# Patient Record
Sex: FEMALE | Race: WHITE | NOT HISPANIC OR LATINO
[De-identification: names, ages, dates, MRNs, and addresses within clinical notes are randomized per-mention and may not be internally consistent; named-entity substitution may affect disease eponyms.]

---

## 2018-05-11 ENCOUNTER — CHARTING TRANS (OUTPATIENT)
Dept: OTHER | Age: 43
End: 2018-05-11

## 2018-11-01 VITALS
RESPIRATION RATE: 14 BRPM | BODY MASS INDEX: 21.23 KG/M2 | WEIGHT: 119.82 LBS | HEIGHT: 63 IN | TEMPERATURE: 98.1 F | HEART RATE: 84 BPM

## 2018-12-15 ENCOUNTER — WALK IN (OUTPATIENT)
Dept: URGENT CARE | Age: 43
End: 2018-12-15

## 2018-12-15 VITALS
SYSTOLIC BLOOD PRESSURE: 118 MMHG | WEIGHT: 125 LBS | DIASTOLIC BLOOD PRESSURE: 72 MMHG | HEIGHT: 63 IN | HEART RATE: 77 BPM | TEMPERATURE: 98.3 F | OXYGEN SATURATION: 99 % | RESPIRATION RATE: 18 BRPM | BODY MASS INDEX: 22.15 KG/M2

## 2018-12-15 DIAGNOSIS — H10.532 CONTACT BLEPHAROCONJUNCTIVITIS OF LEFT EYE: Primary | ICD-10-CM

## 2018-12-15 PROCEDURE — 99213 OFFICE O/P EST LOW 20 MIN: CPT | Performed by: NURSE PRACTITIONER

## 2018-12-15 RX ORDER — ERYTHROMYCIN 5 MG/G
OINTMENT OPHTHALMIC
Qty: 3.5 G | Refills: 0 | Status: SHIPPED | OUTPATIENT
Start: 2018-12-15

## 2018-12-15 RX ORDER — NORGESTIMATE AND ETHINYL ESTRADIOL 7DAYSX3 LO
KIT ORAL
COMMUNITY

## 2018-12-15 ASSESSMENT — ENCOUNTER SYMPTOMS
VOMITING: 0
WOUND: 0
SORE THROAT: 0
RHINORRHEA: 0
BACK PAIN: 0
APPETITE CHANGE: 0
DIARRHEA: 0
EYE PAIN: 0
SHORTNESS OF BREATH: 0
CHILLS: 0
NAUSEA: 0
EYE REDNESS: 1
EYE DISCHARGE: 0
NUMBNESS: 0
ABDOMINAL PAIN: 0
EYE ITCHING: 1
COUGH: 0
HEADACHES: 0
FEVER: 0
DIZZINESS: 0
PHOTOPHOBIA: 0
FATIGUE: 0
WEAKNESS: 0

## 2025-03-03 ENCOUNTER — OFFICE VISIT (OUTPATIENT)
Dept: FAMILY MEDICINE CLINIC | Facility: CLINIC | Age: 50
End: 2025-03-03
Payer: COMMERCIAL

## 2025-03-03 VITALS
WEIGHT: 132 LBS | BODY MASS INDEX: 22.53 KG/M2 | HEIGHT: 64 IN | SYSTOLIC BLOOD PRESSURE: 110 MMHG | HEART RATE: 93 BPM | DIASTOLIC BLOOD PRESSURE: 70 MMHG | RESPIRATION RATE: 16 BRPM | TEMPERATURE: 100 F | OXYGEN SATURATION: 96 %

## 2025-03-03 DIAGNOSIS — Z00.00 ANNUAL PHYSICAL EXAM: Primary | ICD-10-CM

## 2025-03-03 LAB
ALBUMIN SERPL-MCNC: 4.5 G/DL (ref 3.2–4.8)
ALBUMIN/GLOB SERPL: 1.3 {RATIO} (ref 1–2)
ALP LIVER SERPL-CCNC: 62 U/L
ALT SERPL-CCNC: <7 U/L
ANION GAP SERPL CALC-SCNC: 11 MMOL/L (ref 0–18)
AST SERPL-CCNC: 17 U/L (ref ?–34)
BASOPHILS # BLD AUTO: 0.04 X10(3) UL (ref 0–0.2)
BASOPHILS NFR BLD AUTO: 0.5 %
BILIRUB SERPL-MCNC: 1.1 MG/DL (ref 0.3–1.2)
BUN BLD-MCNC: 8 MG/DL (ref 9–23)
CALCIUM BLD-MCNC: 9.3 MG/DL (ref 8.7–10.6)
CHLORIDE SERPL-SCNC: 103 MMOL/L (ref 98–112)
CHOLEST SERPL-MCNC: 178 MG/DL (ref ?–200)
CO2 SERPL-SCNC: 23 MMOL/L (ref 21–32)
CREAT BLD-MCNC: 0.86 MG/DL
EGFRCR SERPLBLD CKD-EPI 2021: 83 ML/MIN/1.73M2 (ref 60–?)
EOSINOPHIL # BLD AUTO: 0.11 X10(3) UL (ref 0–0.7)
EOSINOPHIL NFR BLD AUTO: 1.4 %
ERYTHROCYTE [DISTWIDTH] IN BLOOD BY AUTOMATED COUNT: 11.8 %
FASTING PATIENT LIPID ANSWER: NO
FASTING STATUS PATIENT QL REPORTED: NO
GLOBULIN PLAS-MCNC: 3.4 G/DL (ref 2–3.5)
GLUCOSE BLD-MCNC: 118 MG/DL (ref 70–99)
HCT VFR BLD AUTO: 40.2 %
HDLC SERPL-MCNC: 60 MG/DL (ref 40–59)
HGB BLD-MCNC: 13.3 G/DL
IMM GRANULOCYTES # BLD AUTO: 0.02 X10(3) UL (ref 0–1)
IMM GRANULOCYTES NFR BLD: 0.2 %
LDLC SERPL CALC-MCNC: 95 MG/DL (ref ?–100)
LYMPHOCYTES # BLD AUTO: 2.57 X10(3) UL (ref 1–4)
LYMPHOCYTES NFR BLD AUTO: 31.8 %
MCH RBC QN AUTO: 29.4 PG (ref 26–34)
MCHC RBC AUTO-ENTMCNC: 33.1 G/DL (ref 31–37)
MCV RBC AUTO: 88.9 FL
MONOCYTES # BLD AUTO: 0.77 X10(3) UL (ref 0.1–1)
MONOCYTES NFR BLD AUTO: 9.5 %
NEUTROPHILS # BLD AUTO: 4.57 X10 (3) UL (ref 1.5–7.7)
NEUTROPHILS # BLD AUTO: 4.57 X10(3) UL (ref 1.5–7.7)
NEUTROPHILS NFR BLD AUTO: 56.6 %
NONHDLC SERPL-MCNC: 118 MG/DL (ref ?–130)
OSMOLALITY SERPL CALC.SUM OF ELEC: 283 MOSM/KG (ref 275–295)
PLATELET # BLD AUTO: 304 10(3)UL (ref 150–450)
POTASSIUM SERPL-SCNC: 3.8 MMOL/L (ref 3.5–5.1)
PROT SERPL-MCNC: 7.9 G/DL (ref 5.7–8.2)
RBC # BLD AUTO: 4.52 X10(6)UL
SODIUM SERPL-SCNC: 137 MMOL/L (ref 136–145)
T4 FREE SERPL-MCNC: 1.2 NG/DL (ref 0.8–1.7)
TRIGL SERPL-MCNC: 134 MG/DL (ref 30–149)
TSI SER-ACNC: 2.13 UIU/ML (ref 0.55–4.78)
VLDLC SERPL CALC-MCNC: 22 MG/DL (ref 0–30)
WBC # BLD AUTO: 8.1 X10(3) UL (ref 4–11)

## 2025-03-03 PROCEDURE — 80061 LIPID PANEL: CPT | Performed by: NURSE PRACTITIONER

## 2025-03-03 PROCEDURE — 84439 ASSAY OF FREE THYROXINE: CPT | Performed by: NURSE PRACTITIONER

## 2025-03-03 PROCEDURE — 80050 GENERAL HEALTH PANEL: CPT | Performed by: NURSE PRACTITIONER

## 2025-03-03 RX ORDER — TRIAMCINOLONE ACETONIDE 1 MG/G
CREAM TOPICAL
COMMUNITY
Start: 2025-01-07 | End: 2025-03-03 | Stop reason: ALTCHOICE

## 2025-03-03 RX ORDER — FERROUS SULFATE 324(65)MG
1 TABLET, DELAYED RELEASE (ENTERIC COATED) ORAL DAILY
COMMUNITY

## 2025-03-03 RX ORDER — NORGESTIMATE AND ETHINYL ESTRADIOL 7DAYSX3 LO
KIT ORAL
COMMUNITY

## 2025-03-03 NOTE — PROGRESS NOTES
CHIEF COMPLAINT:    Chief Complaint   Patient presents with    Physical       HISTORY OF PRESENT ILLNESS:    Yolanda Vo is a 49 year old female who presents today, March 03, 2025, for an annual physical.    - NUTRITION:  Follows a regular diet.  Drinks approximately 2 bottles of water a day.   - SLEEP:  Sleeps throughout the night.  Wakes up feeling rested.   - SAFETY:  Reports feeling safe at home.   - PHYSICAL ACTIVITY/SOCIAL/HOBBIES:  Achieves less than 2.5hr of exercise a week.  Walking dogs when the weather.  Enjoys reading and bible study.   - GOAL:  Yolanda would like to improve health overall by increasing physical activity to at least 2.5hrs weekly.    Headache  Behind right eye  Began last week  Flash of light to right side on occasion followed by headache  Headache resolves with 600mg motrin  Rates pain at its worst 8/10  Positive for photophobia  Denies change in vision, blurry vision, or loss of visual field  Agreeable to call [facility] for [facility]  Last eye exam January 2025    IMMUNIZATIONS:  Declines today    There is no problem list on file for this patient.       ALLERGIES:  Allergies[1]    CURRENT MEDICATIONS:  Current Outpatient Medications   Medication Sig Dispense Refill    Norgestim-Eth Estrad Triphasic 0.18/0.215/0.25 MG-25 MCG Oral Tab Take by mouth.         MEDICAL HISTORY:  History reviewed. No pertinent past medical history.  History reviewed. No pertinent surgical history.  History reviewed. No pertinent family history.  No family status information on file.     Social History     Socioeconomic History    Marital status:    Tobacco Use    Smoking status: Never     Passive exposure: Never    Smokeless tobacco: Never   Vaping Use    Vaping status: Never Used   Substance and Sexual Activity    Alcohol use: Yes     Comment: social    Drug use: Not Currently     Social Drivers of Health      Received from [facility]    Housing Stability        ROS:    GENERAL:  Denies fever or chills  RESPIRATORY:  Denies difficulty breathing  CARDIAC:  Denies chest pain with exertion  GI:  Denies nausea, vomiting, diarrhea, constipation, or blood in stool  :  Denies blood in urine or painful urination  REPRO:  Denies vaginal discharge or pelvic pain  NEURO:  Denies recent falls   MSKL:  Denies joint stiffness or pain  SKIN:  Denies change in texture of moles   PSYCH: Denies thoughts of self harm or harming others     VITALS:    /70   Pulse 93   Temp 99.6 °F (37.6 °C) (Temporal)   Resp 16   Ht 5' 4\" (1.626 m)   Wt 132 lb (59.9 kg)   LMP 02/16/2025   SpO2 96%   BMI 22.66 kg/m²     Ideal body weight: 54.7 kg (120 lb 9.5 oz)  Adjusted ideal body weight: 56.8 kg (125 lb 2.5 oz)  Wt Readings from Last 3 Encounters:   03/03/25 132 lb (59.9 kg)     BP Readings from Last 3 Encounters:   03/03/25 110/70     Reviewed by Angy Escoto MS, APRN, FNP-BC    PHYSICAL EXAM:    Constitutional:       Appearance: Normal appearance.  Sitting upright on exam table.  Well developed, well nourished, and in no acute distress.  HENT:      Head: Facial features symmetric. Normocephalic and atraumatic.      Right Ear: Canal clear without erythema or drainage.  TM clear and intact, neutral in position.      Left Ear: Canal clear without erythema or drainage.  TM clear and intact, neutral in position.      Nose: Nose normal.      Mouth/Throat: Mucous membranes are moist.  Uvula rises midline.  Eyes:      Extraocular Movements: Extraocular movements intact.      Conjunctiva/sclera: Conjunctivae normal. Sclera anicteric         Pupils: Pupils are equal, round, and reactive to light.   Neck:     Neck is supple. Trachea is midline.  No masses.      No obvious lympadenopathy with palpation of submandibular, pre/posterior auricular, anterior/posterior cervical, occipital, and supraclavicular nodes.  Cardiovascular:      Heart sounds: Regular rate and rhythm without murmur.     BLE  without edema.  Pulmonary:      Effort: Pulmonary effort is normal.      Breath sounds: Lungs clear throughout.     No cough or wheezing.  Abdominal:      General: Abdomen is nondistended, bowel sounds normoactive, soft, nontender.  No organomegaly.  Musculoskeletal:         General: Normal range of motion. Strength of extremities are equal bilaterally.     Range of motion without limitations.  Skin:     General: Skin is warm and dry.      Coloration: Skin is without jaundice     Findings: No bruising or rashes  Neurological:      General: No focal deficit present. Speech is clear and organized.     Mental Status: Alert and oriented to person, place, and time.      Sensory: Sensation is intact.      Motor: Motor function is intact. Movements are smooth and controlled without ataxia.     Coordination: Coordination is intact. Coordination normal.      Gait: Gait steady and nonantalgic.      Deep Tendon Reflexes: Reflexes 2+ bilaterally.  Psychiatric:         Mood and Affect: Mood normal.         Behavior: Behavior normal.         Thought Content: Thought content normal.         Judgment: Judgment normal.     ASSESSMENT & PLAN:  Plan on follow-up in 1 year or earlier if needed  Refer to result notes for further information    1. Annual physical exam  - CBC With Differential With Platelet; Future  - TSH and Free T4; Future  - Comp Metabolic Panel (14); Future  - Lipid Panel; Future  - VENIPUNCTURE  - CBC With Differential With Platelet  - TSH and Free T4  - Comp Metabolic Panel (14)  - Lipid Panel         [1] No Known Allergies